# Patient Record
Sex: MALE | Race: WHITE | NOT HISPANIC OR LATINO | URBAN - METROPOLITAN AREA
[De-identification: names, ages, dates, MRNs, and addresses within clinical notes are randomized per-mention and may not be internally consistent; named-entity substitution may affect disease eponyms.]

---

## 2019-02-25 ENCOUNTER — NEW PATIENT (OUTPATIENT)
Dept: URBAN - METROPOLITAN AREA CLINIC 30 | Facility: CLINIC | Age: 74
End: 2019-02-25
Payer: MEDICARE

## 2019-02-25 DIAGNOSIS — H35.372 PUCKERING OF MACULA, LEFT EYE: ICD-10-CM

## 2019-02-25 DIAGNOSIS — Z86.73 OLD CVA: ICD-10-CM

## 2019-02-25 DIAGNOSIS — Z96.1 PRESENCE OF INTRAOCULAR LENS: ICD-10-CM

## 2019-02-25 DIAGNOSIS — H40.023 OPEN ANGLE WITH BORDERLINE FINDINGS, HIGH RISK, BILATERAL: ICD-10-CM

## 2019-02-25 DIAGNOSIS — H00.012 HORDEOLUM, RIGHT LOWER LID: Primary | ICD-10-CM

## 2019-02-25 PROCEDURE — 92134 CPTRZ OPH DX IMG PST SGM RTA: CPT | Performed by: OPTOMETRIST

## 2019-02-25 PROCEDURE — 92004 COMPRE OPH EXAM NEW PT 1/>: CPT | Performed by: OPTOMETRIST

## 2019-02-25 RX ORDER — POLYMYXIN B SULFATE AND TRIMETHOPRIM SULFATE 100000; 1 [USP'U]/ML; MG/ML
SOLUTION/ DROPS OPHTHALMIC
Qty: 1 | Refills: 0 | Status: ACTIVE
Start: 2019-02-25

## 2019-02-25 ASSESSMENT — KERATOMETRY
OD: 42.80
OS: 42.83

## 2019-02-25 ASSESSMENT — INTRAOCULAR PRESSURE
OD: 18
OS: 16

## 2019-02-25 ASSESSMENT — VISUAL ACUITY
OS: 20/20
OD: 20/30

## 2019-03-28 ENCOUNTER — FOLLOW UP ESTABLISHED (OUTPATIENT)
Dept: URBAN - METROPOLITAN AREA CLINIC 24 | Facility: CLINIC | Age: 74
End: 2019-03-28
Payer: MEDICARE

## 2019-03-28 PROCEDURE — 92083 EXTENDED VISUAL FIELD XM: CPT | Performed by: OPTOMETRIST

## 2019-03-28 PROCEDURE — 76514 ECHO EXAM OF EYE THICKNESS: CPT | Performed by: OPTOMETRIST

## 2019-03-28 PROCEDURE — 92012 INTRM OPH EXAM EST PATIENT: CPT | Performed by: OPTOMETRIST

## 2019-03-28 PROCEDURE — 92133 CPTRZD OPH DX IMG PST SGM ON: CPT | Performed by: OPTOMETRIST

## 2019-03-28 ASSESSMENT — INTRAOCULAR PRESSURE
OS: 20
OD: 21

## 2019-04-09 ENCOUNTER — APPOINTMENT (OUTPATIENT)
Dept: URBAN - METROPOLITAN AREA CLINIC 282 | Age: 74
Setting detail: DERMATOLOGY
End: 2019-04-10

## 2019-04-09 DIAGNOSIS — Z71.89 OTHER SPECIFIED COUNSELING: ICD-10-CM

## 2019-04-09 DIAGNOSIS — L57.0 ACTINIC KERATOSIS: ICD-10-CM

## 2019-04-09 DIAGNOSIS — D18.0 HEMANGIOMA: ICD-10-CM

## 2019-04-09 DIAGNOSIS — Z12.83 ENCOUNTER FOR SCREENING FOR MALIGNANT NEOPLASM OF SKIN: ICD-10-CM

## 2019-04-09 DIAGNOSIS — L82.1 OTHER SEBORRHEIC KERATOSIS: ICD-10-CM

## 2019-04-09 PROBLEM — D18.01 HEMANGIOMA OF SKIN AND SUBCUTANEOUS TISSUE: Status: ACTIVE | Noted: 2019-04-09

## 2019-04-09 PROCEDURE — OTHER COUNSELING: OTHER

## 2019-04-09 PROCEDURE — 17000 DESTRUCT PREMALG LESION: CPT

## 2019-04-09 PROCEDURE — 17003 DESTRUCT PREMALG LES 2-14: CPT

## 2019-04-09 PROCEDURE — OTHER MIPS QUALITY: OTHER

## 2019-04-09 PROCEDURE — OTHER LIQUID NITROGEN: OTHER

## 2019-04-09 PROCEDURE — 99203 OFFICE O/P NEW LOW 30 MIN: CPT | Mod: 25

## 2019-04-09 ASSESSMENT — LOCATION DETAILED DESCRIPTION DERM
LOCATION DETAILED: LEFT LATERAL FRONTAL SCALP
LOCATION DETAILED: LEFT SUPERIOR FOREHEAD
LOCATION DETAILED: RIGHT SUPERIOR HELIX
LOCATION DETAILED: EPIGASTRIC SKIN
LOCATION DETAILED: RIGHT SUPERIOR MEDIAL FOREHEAD
LOCATION DETAILED: LEFT SUPERIOR MEDIAL FOREHEAD
LOCATION DETAILED: RIGHT MEDIAL UPPER BACK

## 2019-04-09 ASSESSMENT — LOCATION ZONE DERM
LOCATION ZONE: EAR
LOCATION ZONE: TRUNK
LOCATION ZONE: SCALP
LOCATION ZONE: FACE

## 2019-04-09 ASSESSMENT — LOCATION SIMPLE DESCRIPTION DERM
LOCATION SIMPLE: RIGHT UPPER BACK
LOCATION SIMPLE: LEFT SCALP
LOCATION SIMPLE: ABDOMEN
LOCATION SIMPLE: LEFT FOREHEAD
LOCATION SIMPLE: RIGHT EAR
LOCATION SIMPLE: RIGHT FOREHEAD

## 2019-04-09 NOTE — PROCEDURE: LIQUID NITROGEN
Detail Level: Detailed
Number Of Freeze-Thaw Cycles: 1 freeze-thaw cycle
Render Post-Care Instructions In Note?: no
Post-Care Instructions: I reviewed with the patient in detail post-care instructions. Patient is to wear sunprotection, and avoid picking at any of the treated lesions. Pt may apply Vaseline to crusted or scabbing areas.
Duration Of Freeze Thaw-Cycle (Seconds): 8
Consent: The patient's consent was obtained including but not limited to risks of crusting, scabbing, blistering, scarring, darker or lighter pigmentary change, recurrence, incomplete removal and infection.

## 2019-09-03 ENCOUNTER — APPOINTMENT (OUTPATIENT)
Dept: URBAN - METROPOLITAN AREA CLINIC 282 | Age: 74
Setting detail: DERMATOLOGY
End: 2019-09-05

## 2019-09-03 DIAGNOSIS — L82.1 OTHER SEBORRHEIC KERATOSIS: ICD-10-CM

## 2019-09-03 DIAGNOSIS — L20.89 OTHER ATOPIC DERMATITIS: ICD-10-CM

## 2019-09-03 DIAGNOSIS — L57.0 ACTINIC KERATOSIS: ICD-10-CM

## 2019-09-03 DIAGNOSIS — Z86.007 PERSONAL HISTORY OF IN-SITU NEOPLASM OF SKIN: ICD-10-CM

## 2019-09-03 DIAGNOSIS — D69.2 OTHER NONTHROMBOCYTOPENIC PURPURA: ICD-10-CM

## 2019-09-03 PROBLEM — L20.84 INTRINSIC (ALLERGIC) ECZEMA: Status: ACTIVE | Noted: 2019-09-03

## 2019-09-03 PROBLEM — Z85.828 PERSONAL HISTORY OF OTHER MALIGNANT NEOPLASM OF SKIN: Status: ACTIVE | Noted: 2019-09-03

## 2019-09-03 PROCEDURE — 17000 DESTRUCT PREMALG LESION: CPT

## 2019-09-03 PROCEDURE — OTHER COUNSELING: OTHER

## 2019-09-03 PROCEDURE — OTHER LIQUID NITROGEN: OTHER

## 2019-09-03 PROCEDURE — OTHER PRESCRIPTION: OTHER

## 2019-09-03 PROCEDURE — 99213 OFFICE O/P EST LOW 20 MIN: CPT | Mod: 25

## 2019-09-03 PROCEDURE — 17003 DESTRUCT PREMALG LES 2-14: CPT

## 2019-09-03 RX ORDER — BETAMETHASONE DIPROPIONATE 0.5 MG/G
OINTMENT TOPICAL
Qty: 1 | Refills: 1 | Status: ERX | COMMUNITY
Start: 2019-09-03

## 2019-09-03 ASSESSMENT — LOCATION SIMPLE DESCRIPTION DERM
LOCATION SIMPLE: RIGHT MIDDLE FINGER
LOCATION SIMPLE: LEFT FOREARM
LOCATION SIMPLE: RIGHT FOREARM
LOCATION SIMPLE: RIGHT HAND
LOCATION SIMPLE: RIGHT TEMPLE

## 2019-09-03 ASSESSMENT — LOCATION DETAILED DESCRIPTION DERM
LOCATION DETAILED: LEFT DISTAL DORSAL FOREARM
LOCATION DETAILED: RIGHT DISTAL DORSAL FOREARM
LOCATION DETAILED: LEFT DISTAL RADIAL DORSAL FOREARM
LOCATION DETAILED: LEFT PROXIMAL DORSAL FOREARM
LOCATION DETAILED: RIGHT MID TEMPLE
LOCATION DETAILED: RIGHT RADIAL PALM
LOCATION DETAILED: RIGHT MIDDLE PROXIMAL INTERPHALANGEAL JOINT

## 2019-09-03 ASSESSMENT — LOCATION ZONE DERM
LOCATION ZONE: HAND
LOCATION ZONE: FINGER
LOCATION ZONE: FACE
LOCATION ZONE: ARM

## 2019-09-03 NOTE — PROCEDURE: LIQUID NITROGEN
Number Of Freeze-Thaw Cycles: 1 freeze-thaw cycle
Render Note In Bullet Format When Appropriate: No
Duration Of Freeze Thaw-Cycle (Seconds): 8
Post-Care Instructions: I reviewed with the patient in detail post-care instructions. Patient is to wear sunprotection, and avoid picking at any of the treated lesions. Pt may apply Vaseline to crusted or scabbing areas.
Detail Level: Detailed
Consent: The patient's consent was obtained including but not limited to risks of crusting, scabbing, blistering, scarring, darker or lighter pigmentary change, recurrence, incomplete removal and infection.

## 2020-02-27 ENCOUNTER — APPOINTMENT (OUTPATIENT)
Dept: URBAN - METROPOLITAN AREA CLINIC 282 | Age: 75
Setting detail: DERMATOLOGY
End: 2020-02-28

## 2020-02-27 DIAGNOSIS — L30.1 DYSHIDROSIS [POMPHOLYX]: ICD-10-CM

## 2020-02-27 PROCEDURE — 99213 OFFICE O/P EST LOW 20 MIN: CPT

## 2020-02-27 PROCEDURE — OTHER COUNSELING: OTHER

## 2020-02-27 PROCEDURE — OTHER PRESCRIPTION: OTHER

## 2020-02-27 PROCEDURE — OTHER TREATMENT REGIMEN: OTHER

## 2020-02-27 RX ORDER — CRISABOROLE 20 MG/G
OINTMENT TOPICAL
Qty: 1 | Refills: 2 | Status: ACTIVE

## 2020-02-27 ASSESSMENT — LOCATION SIMPLE DESCRIPTION DERM
LOCATION SIMPLE: LEFT HAND
LOCATION SIMPLE: RIGHT HAND

## 2020-02-27 ASSESSMENT — LOCATION ZONE DERM: LOCATION ZONE: HAND

## 2020-02-27 ASSESSMENT — LOCATION DETAILED DESCRIPTION DERM
LOCATION DETAILED: LEFT THENAR EMINENCE
LOCATION DETAILED: RIGHT THENAR EMINENCE

## 2020-02-27 NOTE — PROCEDURE: TREATMENT REGIMEN
Plan: 8 sampl-Eucrisa bid x 2wks.  Pt will call Dr.Chen pabon results.\\nStop Body Wash & use Dove soap, put on gloves when washing dishes w Alis.\\nPut Cerave oint after ALL hand washing & stop Vaseline.
Detail Level: Simple